# Patient Record
Sex: MALE | NOT HISPANIC OR LATINO | URBAN - METROPOLITAN AREA
[De-identification: names, ages, dates, MRNs, and addresses within clinical notes are randomized per-mention and may not be internally consistent; named-entity substitution may affect disease eponyms.]

---

## 2018-04-19 ENCOUNTER — INPATIENT (INPATIENT)
Facility: HOSPITAL | Age: 58
LOS: 0 days | Discharge: HOME | DRG: 343 | End: 2018-04-20
Attending: SURGERY | Admitting: SURGERY
Payer: COMMERCIAL

## 2018-04-19 VITALS
HEIGHT: 72 IN | TEMPERATURE: 98 F | DIASTOLIC BLOOD PRESSURE: 95 MMHG | SYSTOLIC BLOOD PRESSURE: 158 MMHG | HEART RATE: 78 BPM | WEIGHT: 211.64 LBS | RESPIRATION RATE: 18 BRPM | OXYGEN SATURATION: 99 %

## 2018-04-19 LAB
ALBUMIN SERPL ELPH-MCNC: 4.8 G/DL — SIGNIFICANT CHANGE UP (ref 3.3–5)
ALP SERPL-CCNC: 63 U/L — SIGNIFICANT CHANGE UP (ref 40–120)
ALT FLD-CCNC: 27 U/L — SIGNIFICANT CHANGE UP (ref 10–45)
ANION GAP SERPL CALC-SCNC: 12 MMOL/L — SIGNIFICANT CHANGE UP (ref 5–17)
APTT BLD: 32.3 SEC — SIGNIFICANT CHANGE UP (ref 27.5–37.4)
AST SERPL-CCNC: 21 U/L — SIGNIFICANT CHANGE UP (ref 10–40)
BILIRUB SERPL-MCNC: 0.9 MG/DL — SIGNIFICANT CHANGE UP (ref 0.2–1.2)
BUN SERPL-MCNC: 13 MG/DL — SIGNIFICANT CHANGE UP (ref 7–23)
CALCIUM SERPL-MCNC: 9.7 MG/DL — SIGNIFICANT CHANGE UP (ref 8.4–10.5)
CHLORIDE SERPL-SCNC: 103 MMOL/L — SIGNIFICANT CHANGE UP (ref 96–108)
CO2 SERPL-SCNC: 26 MMOL/L — SIGNIFICANT CHANGE UP (ref 22–31)
CREAT SERPL-MCNC: 0.96 MG/DL — SIGNIFICANT CHANGE UP (ref 0.5–1.3)
GLUCOSE SERPL-MCNC: 112 MG/DL — HIGH (ref 70–99)
HCT VFR BLD CALC: 45.6 % — SIGNIFICANT CHANGE UP (ref 39–50)
HGB BLD-MCNC: 15.2 G/DL — SIGNIFICANT CHANGE UP (ref 13–17)
INR BLD: 1.02 — SIGNIFICANT CHANGE UP (ref 0.88–1.16)
LACTATE SERPL-SCNC: 1.3 MMOL/L — SIGNIFICANT CHANGE UP (ref 0.5–2)
MCHC RBC-ENTMCNC: 30 PG — SIGNIFICANT CHANGE UP (ref 27–34)
MCHC RBC-ENTMCNC: 33.3 G/DL — SIGNIFICANT CHANGE UP (ref 32–36)
MCV RBC AUTO: 90.1 FL — SIGNIFICANT CHANGE UP (ref 80–100)
PLATELET # BLD AUTO: 300 K/UL — SIGNIFICANT CHANGE UP (ref 150–400)
POTASSIUM SERPL-MCNC: 3.9 MMOL/L — SIGNIFICANT CHANGE UP (ref 3.5–5.3)
POTASSIUM SERPL-SCNC: 3.9 MMOL/L — SIGNIFICANT CHANGE UP (ref 3.5–5.3)
PROT SERPL-MCNC: 8.6 G/DL — HIGH (ref 6–8.3)
PROTHROM AB SERPL-ACNC: 11.3 SEC — SIGNIFICANT CHANGE UP (ref 9.8–12.7)
RBC # BLD: 5.06 M/UL — SIGNIFICANT CHANGE UP (ref 4.2–5.8)
RBC # FLD: 14 % — SIGNIFICANT CHANGE UP (ref 10.3–16.9)
SODIUM SERPL-SCNC: 141 MMOL/L — SIGNIFICANT CHANGE UP (ref 135–145)
WBC # BLD: 6.4 K/UL — SIGNIFICANT CHANGE UP (ref 3.8–10.5)
WBC # FLD AUTO: 6.4 K/UL — SIGNIFICANT CHANGE UP (ref 3.8–10.5)

## 2018-04-19 PROCEDURE — 71045 X-RAY EXAM CHEST 1 VIEW: CPT | Mod: 26

## 2018-04-19 PROCEDURE — 99285 EMERGENCY DEPT VISIT HI MDM: CPT | Mod: 25

## 2018-04-19 PROCEDURE — 93010 ELECTROCARDIOGRAM REPORT: CPT

## 2018-04-19 RX ORDER — AMLODIPINE BESYLATE 2.5 MG/1
10 TABLET ORAL DAILY
Qty: 0 | Refills: 0 | Status: DISCONTINUED | OUTPATIENT
Start: 2018-04-19 | End: 2018-04-20

## 2018-04-19 RX ORDER — CEFAZOLIN SODIUM 1 G
2000 VIAL (EA) INJECTION EVERY 8 HOURS
Qty: 0 | Refills: 0 | Status: DISCONTINUED | OUTPATIENT
Start: 2018-04-19 | End: 2018-04-19

## 2018-04-19 RX ORDER — AMLODIPINE BESYLATE 2.5 MG/1
1 TABLET ORAL
Qty: 0 | Refills: 0 | COMMUNITY

## 2018-04-19 RX ORDER — METRONIDAZOLE 500 MG
500 TABLET ORAL ONCE
Qty: 0 | Refills: 0 | Status: COMPLETED | OUTPATIENT
Start: 2018-04-19 | End: 2018-04-19

## 2018-04-19 RX ORDER — OXYCODONE AND ACETAMINOPHEN 5; 325 MG/1; MG/1
1 TABLET ORAL EVERY 4 HOURS
Qty: 0 | Refills: 0 | Status: DISCONTINUED | OUTPATIENT
Start: 2018-04-19 | End: 2018-04-20

## 2018-04-19 RX ORDER — SODIUM CHLORIDE 9 MG/ML
1000 INJECTION INTRAMUSCULAR; INTRAVENOUS; SUBCUTANEOUS ONCE
Qty: 0 | Refills: 0 | Status: COMPLETED | OUTPATIENT
Start: 2018-04-19 | End: 2018-04-19

## 2018-04-19 RX ORDER — CIPROFLOXACIN LACTATE 400MG/40ML
400 VIAL (ML) INTRAVENOUS ONCE
Qty: 0 | Refills: 0 | Status: COMPLETED | OUTPATIENT
Start: 2018-04-19 | End: 2018-04-19

## 2018-04-19 RX ORDER — DEXTROSE MONOHYDRATE, SODIUM CHLORIDE, AND POTASSIUM CHLORIDE 50; .745; 4.5 G/1000ML; G/1000ML; G/1000ML
1000 INJECTION, SOLUTION INTRAVENOUS
Qty: 0 | Refills: 0 | Status: DISCONTINUED | OUTPATIENT
Start: 2018-04-19 | End: 2018-04-20

## 2018-04-19 RX ORDER — OXYCODONE AND ACETAMINOPHEN 5; 325 MG/1; MG/1
2 TABLET ORAL EVERY 4 HOURS
Qty: 0 | Refills: 0 | Status: DISCONTINUED | OUTPATIENT
Start: 2018-04-19 | End: 2018-04-20

## 2018-04-19 RX ORDER — CIPROFLOXACIN LACTATE 400MG/40ML
VIAL (ML) INTRAVENOUS
Qty: 0 | Refills: 0 | Status: DISCONTINUED | OUTPATIENT
Start: 2018-04-19 | End: 2018-04-19

## 2018-04-19 RX ORDER — ONDANSETRON 8 MG/1
4 TABLET, FILM COATED ORAL EVERY 6 HOURS
Qty: 0 | Refills: 0 | Status: DISCONTINUED | OUTPATIENT
Start: 2018-04-19 | End: 2018-04-20

## 2018-04-19 RX ORDER — HEPARIN SODIUM 5000 [USP'U]/ML
5000 INJECTION INTRAVENOUS; SUBCUTANEOUS EVERY 8 HOURS
Qty: 0 | Refills: 0 | Status: DISCONTINUED | OUTPATIENT
Start: 2018-04-19 | End: 2018-04-20

## 2018-04-19 RX ORDER — HYDROMORPHONE HYDROCHLORIDE 2 MG/ML
0.5 INJECTION INTRAMUSCULAR; INTRAVENOUS; SUBCUTANEOUS EVERY 4 HOURS
Qty: 0 | Refills: 0 | Status: DISCONTINUED | OUTPATIENT
Start: 2018-04-19 | End: 2018-04-20

## 2018-04-19 RX ORDER — DEXTROSE MONOHYDRATE, SODIUM CHLORIDE, AND POTASSIUM CHLORIDE 50; .745; 4.5 G/1000ML; G/1000ML; G/1000ML
1000 INJECTION, SOLUTION INTRAVENOUS
Qty: 0 | Refills: 0 | Status: DISCONTINUED | OUTPATIENT
Start: 2018-04-19 | End: 2018-04-19

## 2018-04-19 RX ADMIN — Medication 200 MILLIGRAM(S): at 21:11

## 2018-04-19 RX ADMIN — HEPARIN SODIUM 5000 UNIT(S): 5000 INJECTION INTRAVENOUS; SUBCUTANEOUS at 21:11

## 2018-04-19 RX ADMIN — Medication 100 MILLIGRAM(S): at 08:44

## 2018-04-19 RX ADMIN — OXYCODONE AND ACETAMINOPHEN 1 TABLET(S): 5; 325 TABLET ORAL at 22:10

## 2018-04-19 RX ADMIN — DEXTROSE MONOHYDRATE, SODIUM CHLORIDE, AND POTASSIUM CHLORIDE 65 MILLILITER(S): 50; .745; 4.5 INJECTION, SOLUTION INTRAVENOUS at 15:32

## 2018-04-19 RX ADMIN — OXYCODONE AND ACETAMINOPHEN 1 TABLET(S): 5; 325 TABLET ORAL at 21:11

## 2018-04-19 RX ADMIN — SODIUM CHLORIDE 2000 MILLILITER(S): 9 INJECTION INTRAMUSCULAR; INTRAVENOUS; SUBCUTANEOUS at 07:52

## 2018-04-19 RX ADMIN — Medication 100 MILLIGRAM(S): at 19:27

## 2018-04-19 RX ADMIN — Medication 200 MILLIGRAM(S): at 09:14

## 2018-04-19 NOTE — PROGRESS NOTE ADULT - SUBJECTIVE AND OBJECTIVE BOX
STATUS POST:  lap appendectomy    SUBJECTIVE: Pt seen and examined bedside. pt admits to mild abdominal pain that is controlled. Pt denies nausea, vomiting, bowel movements, flatus, SOB    Vital Signs Last 24 Hrs  T(C): 36.6 (19 Apr 2018 08:57), Max: 36.6 (19 Apr 2018 08:57)  T(F): 97.9 (19 Apr 2018 08:57), Max: 97.9 (19 Apr 2018 08:57)  HR: 68 (19 Apr 2018 15:10) (66 - 79)  BP: 121/75 (19 Apr 2018 15:10) (117/66 - 158/95)  BP(mean): 91 (19 Apr 2018 15:10) (84 - 98)  RR: 10 (19 Apr 2018 15:10) (10 - 18)  SpO2: 100% (19 Apr 2018 15:10) (99% - 100%)  I&O's Summary    I&O's Detail        LABS:                        15.2   6.4   )-----------( 300      ( 19 Apr 2018 07:57 )             45.6     04-19    141  |  103  |  13  ----------------------------<  112<H>  3.9   |  26  |  0.96    Ca    9.7      19 Apr 2018 07:57    TPro  8.6<H>  /  Alb  4.8  /  TBili  0.9  /  DBili  x   /  AST  21  /  ALT  27  /  AlkPhos  63  04-19    PT/INR - ( 19 Apr 2018 07:57 )   PT: 11.3 sec;   INR: 1.02          PTT - ( 19 Apr 2018 07:57 )  PTT:32.3 sec      Physical exam :   constitutional: NAD, resting comfortable in bed  resp: CTA BL  cardio: RRR  abd: soft, mild distension, diffusely tender, incisions C/D/I      A/P: 57y Male   - Diet: CLD  - Activity  - AM Labs  - Pain medication  - DVT ppx  - Antibiotic- cipro/flagyl

## 2018-04-19 NOTE — ED PROVIDER NOTE - MEDICAL DECISION MAKING DETAILS
Case d/w Dr. Lainez,   58 yo m presents to ed with known diagnosis of acute appendicitis.  Pt reports intermittent abdominal pain, bloating and constipation for the past few weeks.  On arrival surgery resident met pt in ed, as per surgery plan to admit pt for abx for recurrent/chronic appendicitis and further surgical evaluation .

## 2018-04-19 NOTE — ED PROVIDER NOTE - OBJECTIVE STATEMENT
SM 58 yo m presents to ed with known diagnosis of acute appendicitis.  Pt reports intermittent abdominal pain, bloating and constipation for the past few weeks.  Pt saw his pmd for pain and subsequently had CT abdomen on march 9 which showed appendicitis.  Pt seen by Dr. Wei 2 weeks ago and told he has appendicitis and will likely need surgery.  Pt reports at this time some bloating and discomfort.  Pt otherwise denies fevers, chills, nausea, vomiting, bloody stool, hx of abdominal surgery.

## 2018-04-19 NOTE — H&P ADULT - HISTORY OF PRESENT ILLNESS
57 M with PMHx of HTN presents complaining of abd pain x 3 weeks. Pain began as sharp, crampy, diffuse abdominal pain and decreased appetite. He presented to PMD that recommended out patient CT which was preformed and showed acute appendicitis. He was referred to Dr Wei for operative management. Currently he experiences abdominal bloating and pain accompanied by nausea after eating. He denies SOB, CP, fever/ chills, cough, vomiting, changes in bowel patterns or urination.

## 2018-04-19 NOTE — H&P ADULT - NSHPPHYSICALEXAM_GEN_ALL_CORE
Vital Signs Last 24 Hrs  T(C): 36.4 (19 Apr 2018 07:15), Max: 36.4 (19 Apr 2018 07:15)  T(F): 97.5 (19 Apr 2018 07:15), Max: 97.5 (19 Apr 2018 07:15)  HR: 78 (19 Apr 2018 07:15) (78 - 78)  BP: 158/95 (19 Apr 2018 07:15) (158/95 - 158/95)  BP(mean): --  RR: 18 (19 Apr 2018 07:15) (18 - 18)  SpO2: 99% (19 Apr 2018 07:15) (99% - 99%)    General: A/O x4 NAD  Resp: Normal work of breathing on RA  CV: RRR no MRG  ABD: soft, nt nd no rebound tenderness, negative psoas sign Vital Signs Last 24 Hrs  T(C): 36.4 (19 Apr 2018 07:15), Max: 36.4 (19 Apr 2018 07:15)  T(F): 97.5 (19 Apr 2018 07:15), Max: 97.5 (19 Apr 2018 07:15)  HR: 78 (19 Apr 2018 07:15) (78 - 78)  BP: 158/95 (19 Apr 2018 07:15) (158/95 - 158/95)  BP(mean): --  RR: 18 (19 Apr 2018 07:15) (18 - 18)  SpO2: 99% (19 Apr 2018 07:15) (99% - 99%)    General: A/O x4 NAD  Resp: Normal work of breathing on RA  CV: RRR no MRG  ABD: right lower quadrant tenderness, rebound tenderness, negative psoas sign

## 2018-04-19 NOTE — ED ADULT NURSE NOTE - OBJECTIVE STATEMENT
58 y/o male with hx of HTN arrived to [facility] ER for pre-operative lab work for acute appendicitis. Pt verbalized that he started feeling severe abdominal pain February 28th, 2018 accompanied with intermittent constipation. Upon assessment, abdomen tender to touch, lung fields WNL, breathing is equal and unlabored, pulses palpable, no visible injuries. Pt denies chest pain, headache, dizziness, blurred vision, slurred speech, nausea, vomiting, diarrhea, fever, chills, LOC, SOB, recent travel, recent injury, and palpitations. Care in progress. Awaiting disposition

## 2018-04-19 NOTE — ED PROVIDER NOTE - ATTENDING CONTRIBUTION TO CARE
pt with intermittent abd pain for a few weeks, CT on Mar 9 with appendicitis, no surgery at that time and pain unresolved , sent in by surgery for intervention, seen by Dr. Wei as outpt, pt clinically stable at this time, abd exam: non tender, nl WBC

## 2018-04-19 NOTE — H&P ADULT - NSHPLABSRESULTS_GEN_ALL_CORE
15.2   6.4   )-----------( 300      ( 19 Apr 2018 07:57 )             45.6 15.2   6.4   )-----------( 300      ( 19 Apr 2018 07:57 )             45.6    04-19    141  |  103  |  13  ----------------------------<  112<H>  3.9   |  26  |  0.96    Ca    9.7      19 Apr 2018 07:57    TPro  8.6<H>  /  Alb  4.8  /  TBili  0.9  /  DBili  x   /  AST  21  /  ALT  27  /  AlkPhos  63  04-19

## 2018-04-19 NOTE — H&P ADULT - ASSESSMENT
57 M with PMHx of HTN presents with complaint of abd pain x 3 weeks outpatient CT shows acute appendicitis.     -Pain/ Nausea control PRN  -IVF  -NPO  -Cipro/ Flagyl  -Pre-op for OR today  -Case and plan discusses with Chief resident and Dr Wei. 57 M with PMHx of HTN presents with complaint of abd pain x 3 weeks outpatient CT shows acute appendicitis.     -Pain/ Nausea control PRN  -IVF  -NPO  -Cipro/ Flagyl  -Pre-op for OR today  -Case and plan discusses with Chief resident and Dr Wei.      ATT:   pt seen examined           clinical pic and CAT c/w acute appendicitis           plan-------> OR,  lap appy    Disc'deanna pt   team

## 2018-04-20 VITALS
SYSTOLIC BLOOD PRESSURE: 128 MMHG | TEMPERATURE: 98 F | DIASTOLIC BLOOD PRESSURE: 70 MMHG | HEART RATE: 69 BPM | OXYGEN SATURATION: 100 % | RESPIRATION RATE: 16 BRPM

## 2018-04-20 LAB
ANION GAP SERPL CALC-SCNC: 10 MMOL/L — SIGNIFICANT CHANGE UP (ref 5–17)
BUN SERPL-MCNC: 9 MG/DL — SIGNIFICANT CHANGE UP (ref 7–23)
CALCIUM SERPL-MCNC: 9.3 MG/DL — SIGNIFICANT CHANGE UP (ref 8.4–10.5)
CHLORIDE SERPL-SCNC: 103 MMOL/L — SIGNIFICANT CHANGE UP (ref 96–108)
CO2 SERPL-SCNC: 25 MMOL/L — SIGNIFICANT CHANGE UP (ref 22–31)
CREAT SERPL-MCNC: 0.89 MG/DL — SIGNIFICANT CHANGE UP (ref 0.5–1.3)
GLUCOSE SERPL-MCNC: 122 MG/DL — HIGH (ref 70–99)
HCT VFR BLD CALC: 40.3 % — SIGNIFICANT CHANGE UP (ref 39–50)
HGB BLD-MCNC: 13.5 G/DL — SIGNIFICANT CHANGE UP (ref 13–17)
MAGNESIUM SERPL-MCNC: 2 MG/DL — SIGNIFICANT CHANGE UP (ref 1.6–2.6)
MCHC RBC-ENTMCNC: 30 PG — SIGNIFICANT CHANGE UP (ref 27–34)
MCHC RBC-ENTMCNC: 33.5 G/DL — SIGNIFICANT CHANGE UP (ref 32–36)
MCV RBC AUTO: 89.6 FL — SIGNIFICANT CHANGE UP (ref 80–100)
PHOSPHATE SERPL-MCNC: 3.4 MG/DL — SIGNIFICANT CHANGE UP (ref 2.5–4.5)
PLATELET # BLD AUTO: 273 K/UL — SIGNIFICANT CHANGE UP (ref 150–400)
POTASSIUM SERPL-MCNC: 4 MMOL/L — SIGNIFICANT CHANGE UP (ref 3.5–5.3)
POTASSIUM SERPL-SCNC: 4 MMOL/L — SIGNIFICANT CHANGE UP (ref 3.5–5.3)
RBC # BLD: 4.5 M/UL — SIGNIFICANT CHANGE UP (ref 4.2–5.8)
RBC # FLD: 13.4 % — SIGNIFICANT CHANGE UP (ref 10.3–16.9)
SODIUM SERPL-SCNC: 138 MMOL/L — SIGNIFICANT CHANGE UP (ref 135–145)
WBC # BLD: 13.6 K/UL — HIGH (ref 3.8–10.5)
WBC # FLD AUTO: 13.6 K/UL — HIGH (ref 3.8–10.5)

## 2018-04-20 RX ORDER — DOCUSATE SODIUM 100 MG
1 CAPSULE ORAL
Qty: 30 | Refills: 0 | OUTPATIENT
Start: 2018-04-20

## 2018-04-20 RX ADMIN — HEPARIN SODIUM 5000 UNIT(S): 5000 INJECTION INTRAVENOUS; SUBCUTANEOUS at 05:29

## 2018-04-20 RX ADMIN — OXYCODONE AND ACETAMINOPHEN 1 TABLET(S): 5; 325 TABLET ORAL at 12:31

## 2018-04-20 RX ADMIN — OXYCODONE AND ACETAMINOPHEN 1 TABLET(S): 5; 325 TABLET ORAL at 05:29

## 2018-04-20 RX ADMIN — DEXTROSE MONOHYDRATE, SODIUM CHLORIDE, AND POTASSIUM CHLORIDE 65 MILLILITER(S): 50; .745; 4.5 INJECTION, SOLUTION INTRAVENOUS at 02:38

## 2018-04-20 RX ADMIN — OXYCODONE AND ACETAMINOPHEN 1 TABLET(S): 5; 325 TABLET ORAL at 06:20

## 2018-04-20 RX ADMIN — AMLODIPINE BESYLATE 10 MILLIGRAM(S): 2.5 TABLET ORAL at 05:29

## 2018-04-20 RX ADMIN — OXYCODONE AND ACETAMINOPHEN 1 TABLET(S): 5; 325 TABLET ORAL at 11:31

## 2018-04-20 NOTE — PROGRESS NOTE ADULT - ASSESSMENT
58 yo M with HTN, p/w 3 weeks of abd pain, CT suggested acute appendicitis s/p laparoscopic appendectomy.     Problem 1 : Acute appendicitis s/p lap appy post op care  low res/IVF   Pain control and nausea control  DVT prophylaxis  AM labs     Problem 2 : HTN  Stable   home meds : Amlodipine     Dispo : home once stable 58 yo M with HTN, p/w 3 weeks of abd pain, CT suggested acute appendicitis s/p laparoscopic appendectomy.     Advance diet to regular diet  DC home today if tolerating well

## 2018-04-20 NOTE — PROGRESS NOTE ADULT - SUBJECTIVE AND OBJECTIVE BOX
O/N: passed TOV, adv to low res diet for am, pain well controlled  4/19 : acute appendicitis s/p lap appendectomy. POC wnl    POD 1 Laparoscopic appendectomy O/N: passed TOV, adv to low res diet for am, pain well controlled  4/19 : acute appendicitis s/p lap appendectomy. POC wnl    POD 1 Laparoscopic appendectomy POD1    SUBJECTIVE: Patient seen and examined bedside by chief resident. No nausea/vomiting, no abd pain, not ambulating yet. Tolerating well with CLD    amLODIPine   Tablet 10 milliGRAM(s) Oral daily  heparin  Injectable 5000 Unit(s) SubCutaneous every 8 hours      Vital Signs Last 24 Hrs  T(C): 36.6 (20 Apr 2018 05:32), Max: 36.9 (19 Apr 2018 22:02)  T(F): 97.8 (20 Apr 2018 05:32), Max: 98.5 (19 Apr 2018 22:02)  HR: 70 (20 Apr 2018 05:32) (66 - 92)  BP: 122/63 (20 Apr 2018 05:32) (117/66 - 151/84)  BP(mean): 91 (19 Apr 2018 15:10) (84 - 98)  RR: 16 (20 Apr 2018 05:32) (10 - 18)  SpO2: 100% (20 Apr 2018 05:32) (97% - 100%)  I&O's Detail    19 Apr 2018 07:01  -  20 Apr 2018 07:00  --------------------------------------------------------  IN:    dextrose 5% + sodium chloride 0.45% with potassium chloride 20 mEq/L: 780 mL    Solution: 200 mL  Total IN: 980 mL    OUT:    Voided: 2600 mL  Total OUT: 2600 mL    Total NET: -1620 mL          General: NAD, resting comfortably in bed  C/V: NSR  Pulm: Nonlabored breathing, no respiratory distress  Abd: soft, NT/ND. incisions C/D/I  Extrem: WWP, no edema, SCDs in place        LABS:                        13.5   13.6  )-----------( 273      ( 20 Apr 2018 05:47 )             40.3     04-20    138  |  103  |  9   ----------------------------<  122<H>  4.0   |  25  |  0.89    Ca    9.3      20 Apr 2018 05:47  Phos  3.4     04-20  Mg     2.0     04-20    TPro  8.6<H>  /  Alb  4.8  /  TBili  0.9  /  DBili  x   /  AST  21  /  ALT  27  /  AlkPhos  63  04-19    PT/INR - ( 19 Apr 2018 07:57 )   PT: 11.3 sec;   INR: 1.02          PTT - ( 19 Apr 2018 07:57 )  PTT:32.3 sec      RADIOLOGY & ADDITIONAL STUDIES:

## 2018-04-20 NOTE — DISCHARGE NOTE ADULT - MEDICATION SUMMARY - MEDICATIONS TO TAKE
I will START or STAY ON the medications listed below when I get home from the hospital:    Percocet 5/325 oral tablet  -- 1 tab(s) by mouth 2 times a day, As Needed -for severe pain MDD:2   -- Caution federal law prohibits the transfer of this drug to any person other  than the person for whom it was prescribed.  May cause drowsiness.  Alcohol may intensify this effect.  Use care when operating dangerous machinery.  This prescription cannot be refilled.  This product contains acetaminophen.  Do not use  with any other product containing acetaminophen to prevent possible liver damage.  Using more of this medication than prescribed may cause serious breathing problems.    -- Indication: For post op pain    amLODIPine 10 mg oral tablet  -- 1 tab(s) by mouth once a day  -- Indication: For HTN (hypertension)    Colace 100 mg oral capsule  -- 1 cap(s) by mouth 2 times a day MDD:2  -- Medication should be taken with plenty of water.    -- Indication: For constipation

## 2018-04-20 NOTE — DISCHARGE NOTE ADULT - PLAN OF CARE
Recovery Please resume all regular home medications unless specifically advised not to take a particular medication. Also, please take any new medications as prescribed, percocet for severe pain.  Please get plenty of rest, continue to ambulate several times per day, and drink adequate amounts of fluids. Avoid lifting weights greater than 5-10 lbs until you follow-up with your surgeon, who will instruct you further regarding activity restrictions.  Avoid driving or operating heavy machinery while taking pain medications.  Please follow-up with your surgeon,  in 2 weeks.  Incision Care:  *Please call your doctor or nurse practitioner if you have increased pain, swelling, redness, or drainage from the incision site.  *Avoid swimming and baths until your follow-up appointment.  *You may shower, and wash surgical incisions with a mild soap and warm water. Gently pat the area dry. Warning signs WARNING SIGNS:  Please call your doctor or nurse practitioner if you experience the following:  *You experience new chest pain, pressure, squeezing or tightness.  *New or worsening cough, shortness of breath, or wheeze.  *If you are vomiting and cannot keep down fluids or your medications.  *You are getting dehydrated due to continued vomiting, diarrhea, or other reasons. Signs of dehydration include dry mouth, rapid heartbeat, or feeling dizzy or faint when standing.  *You see blood or dark/black material when you vomit or have a bowel movement.  *You experience burning when you urinate, have blood in your urine, or experience a discharge.  *Your pain is not improving within 8-12 hours or is not gone within 24 hours. Call or return immediately if your pain is getting worse, changes location, or moves to your chest or back.  *You have shaking chills, or fever greater than 101.5 degrees Fahrenheit or 38 degrees Celsius.  *Any change in your symptoms, or any new symptoms that concern you.

## 2018-04-20 NOTE — DISCHARGE NOTE ADULT - HOSPITAL COURSE
This is a 58 yo M with HTN, p/w 3 weeks of abd pain, CT suggested acute appendicitis s/p laparoscopic appendectomy on 4/19/18. The surgery went well without any complications. Upon discharge, patient was clinically well, vital signs stable and labs within normal limits.  He was discharged with instructions to follow up  in 2 weeks.

## 2018-04-20 NOTE — DISCHARGE NOTE ADULT - PATIENT PORTAL LINK FT
You can access the [email or web address] Patient Portal, offered by [facility], by registering with the following website: [email or web address]

## 2018-04-20 NOTE — DISCHARGE NOTE ADULT - CARE PROVIDER_API CALL
Mann Wei (MD), Surgery  [address]  [address]  [place] [address]  Phone: [phone]  Fax: [phone]

## 2018-04-20 NOTE — DISCHARGE NOTE ADULT - CARE PLAN
Principal Discharge DX:	Acute appendicitis, unspecified acute appendicitis type  Goal:	Recovery  Assessment and plan of treatment:	Please resume all regular home medications unless specifically advised not to take a particular medication. Also, please take any new medications as prescribed, percocet for severe pain.  Please get plenty of rest, continue to ambulate several times per day, and drink adequate amounts of fluids. Avoid lifting weights greater than 5-10 lbs until you follow-up with your surgeon, who will instruct you further regarding activity restrictions.  Avoid driving or operating heavy machinery while taking pain medications.  Please follow-up with your surgeon,  in 2 weeks.  Incision Care:  *Please call your doctor or nurse practitioner if you have increased pain, swelling, redness, or drainage from the incision site.  *Avoid swimming and baths until your follow-up appointment.  *You may shower, and wash surgical incisions with a mild soap and warm water. Gently pat the area dry.  Goal:	Warning signs  Assessment and plan of treatment:	WARNING SIGNS:  Please call your doctor or nurse practitioner if you experience the following:  *You experience new chest pain, pressure, squeezing or tightness.  *New or worsening cough, shortness of breath, or wheeze.  *If you are vomiting and cannot keep down fluids or your medications.  *You are getting dehydrated due to continued vomiting, diarrhea, or other reasons. Signs of dehydration include dry mouth, rapid heartbeat, or feeling dizzy or faint when standing.  *You see blood or dark/black material when you vomit or have a bowel movement.  *You experience burning when you urinate, have blood in your urine, or experience a discharge.  *Your pain is not improving within 8-12 hours or is not gone within 24 hours. Call or return immediately if your pain is getting worse, changes location, or moves to your chest or back.  *You have shaking chills, or fever greater than 101.5 degrees Fahrenheit or 38 degrees Celsius.  *Any change in your symptoms, or any new symptoms that concern you.

## 2018-04-24 PROCEDURE — 85730 THROMBOPLASTIN TIME PARTIAL: CPT

## 2018-04-24 PROCEDURE — 71045 X-RAY EXAM CHEST 1 VIEW: CPT

## 2018-04-24 PROCEDURE — 86901 BLOOD TYPING SEROLOGIC RH(D): CPT

## 2018-04-24 PROCEDURE — 36415 COLL VENOUS BLD VENIPUNCTURE: CPT

## 2018-04-24 PROCEDURE — 83735 ASSAY OF MAGNESIUM: CPT

## 2018-04-24 PROCEDURE — 85610 PROTHROMBIN TIME: CPT

## 2018-04-24 PROCEDURE — 86900 BLOOD TYPING SEROLOGIC ABO: CPT

## 2018-04-24 PROCEDURE — 80048 BASIC METABOLIC PNL TOTAL CA: CPT

## 2018-04-24 PROCEDURE — 88304 TISSUE EXAM BY PATHOLOGIST: CPT

## 2018-04-24 PROCEDURE — 80053 COMPREHEN METABOLIC PANEL: CPT

## 2018-04-24 PROCEDURE — 84100 ASSAY OF PHOSPHORUS: CPT

## 2018-04-24 PROCEDURE — 99285 EMERGENCY DEPT VISIT HI MDM: CPT | Mod: 25

## 2018-04-24 PROCEDURE — 83605 ASSAY OF LACTIC ACID: CPT

## 2018-04-24 PROCEDURE — 93005 ELECTROCARDIOGRAM TRACING: CPT

## 2018-04-24 PROCEDURE — 86850 RBC ANTIBODY SCREEN: CPT

## 2018-04-24 PROCEDURE — 85027 COMPLETE CBC AUTOMATED: CPT

## 2018-04-27 LAB — SURGICAL PATHOLOGY STUDY: SIGNIFICANT CHANGE UP

## 2018-04-30 DIAGNOSIS — K35.80 UNSPECIFIED ACUTE APPENDICITIS: ICD-10-CM

## 2018-04-30 DIAGNOSIS — R11.0 NAUSEA: ICD-10-CM

## 2018-04-30 DIAGNOSIS — I10 ESSENTIAL (PRIMARY) HYPERTENSION: ICD-10-CM

## 2021-05-25 NOTE — ED ADULT NURSE NOTE - CAS EDN DISCHARGE INTERVENTIONS
Arm band on/IV intact Tranexamic Acid Pregnancy And Lactation Text: It is unknown if this medication is safe during pregnancy or breast feeding.

## 2022-03-25 NOTE — ED ADULT NURSE NOTE - PAIN: BODY LOCATION
abdomen Cyclosporine Counseling:  I discussed with the patient the risks of cyclosporine including but not limited to hypertension, gingival hyperplasia,myelosuppression, immunosuppression, liver damage, kidney damage, neurotoxicity, lymphoma, and serious infections. The patient understands that monitoring is required including baseline blood pressure, CBC, CMP, lipid panel and uric acid, and then 1-2 times monthly CMP and blood pressure.

## 2022-12-19 NOTE — PRE-OP CHECKLIST - PATIENT'S PERSONAL PROPERTY GIVEN TO
How Severe Are Your Spot(S)?: mild What Type Of Note Output Would You Prefer (Optional)?: Bullet Format What Is The Reason For Today's Visit?: Full Body Skin Examination What Is The Reason For Today's Visit? (Being Monitored For X): concerning skin lesions on an annual basis security/safe